# Patient Record
Sex: FEMALE | Race: WHITE | Employment: FULL TIME | URBAN - METROPOLITAN AREA
[De-identification: names, ages, dates, MRNs, and addresses within clinical notes are randomized per-mention and may not be internally consistent; named-entity substitution may affect disease eponyms.]

---

## 2021-12-22 ENCOUNTER — VIRTUAL VISIT (OUTPATIENT)
Dept: HEMATOLOGY | Age: 26
End: 2021-12-22

## 2021-12-22 DIAGNOSIS — R16.0 LIVER MASS: Primary | ICD-10-CM

## 2021-12-22 PROCEDURE — 99203 OFFICE O/P NEW LOW 30 MIN: CPT | Performed by: INTERNAL MEDICINE

## 2021-12-22 NOTE — PROGRESS NOTES
[place]   FMN. Bloating diffuse non-specific abd pain. IBS. Citrocill. MRI 6 months. Post-pone if pregnant. Fractionate TBILI by PCP. Explain. \  NO  No  Social    [address] [facility]      Tania Gandara MD, Rohan Lovelace MD, MPH      Junior Sheets, PA-MERRILL Murillo, ACNP-BC     April S Marco, AGPCNP-BC   Suzie Butcher, FNP-C    Sandra Sellers, AGNP-BC       Julianna Deputado [address]    at [place] [address] [address] [facility], [address] [address]  [address].    [phone]    FAX: [address] [facility]    [address], [address]    [place], [address]    [phone]    FAX: [phone]       Patient Care Team:  Park Feng NP as PCP - General (Nurse Practitioner)      Problem List  Date Reviewed: 1/1/2022          Codes Class Noted    Gilbert's syndrome ICD-10-CM: E80.4  ICD-9-CM: 277.4  1/1/2022        Focal nodular hyperplasia of liver ICD-10-CM: K76.89  ICD-9-CM: 573.8  1/1/2022              The clinicians listed above have asked me to see Jane Cheng in consultation regarding a liver mass. All medical records sent by the referring physicians were reviewed including imaging studies     The patient is a 31 y.o.  female without any history of previous liver disease. The patient underwent a chest CT because of SOB. PE excluded. A liver mass consistent with FNH was noted. A MRI of the liver was performed in in 12/2021. This demonstrated a single mass measuring 7.0 cm left lobe of the liver with a central scar. The patient has been trying to get pregnant. Imaging studies of the liver suggest a normal liver in addition to the mass. The patient does not have any symptoms which could be attributed to the liver disorder.     The patient is not experiencing the following symptoms which are commonly seen in this liver disorder:   fatigue,   pain in the right side over the liver,     The patient completes all daily activities without any functional limitations. ASSESSMENT AND PLAN:  Liver mass   This is most likely to be Focal Nodular Hyperplasia. Focal Nodular Hyperplasia are benign and in general do not increase in size over time. These lesions are not responsive to estrogens or progesterones. HRT or OCH do not need to be stopped if the patient is taking these medications or can be started. Repeated imaging to monitor the size of these lesions is not necessary now that the diagnosis has been confirmed. The patient is asymptomatic     Laboratory studies and imaging suggest the patient has no evidence of liver disease. Will measure the following serologic cancer markers AFP, CA 19-9, CEA    Serologic testing for causes of chronic liver disease was ordered. Will perform imaging of the liver with dynamic MRI in 3 months. [place] Syndrome  There is a mild elevation in total bilirubin   Suspect that is mostly indirect fraction consistent with [place] disease. Will request that hepatic panel be performed which will contain both total and direct bilirubin   If the DBILI is normal this is consistent with [place] syndrome and no furtehr testing needs to be performed. This benign genetic disorder of bilirubin conjugation has no clinical significance. If the patient fasts for 24 hours the TBILI and IBILI will typically double but the the DBILI will remain the same. This has been utilized to confirm [place] syndrome in some situations. Some patients may develop jaundice with prolonged fasting. Pregnancy  The patient was trying to get pregnant when she was found to have the liver mass/FNH.   Given the description of the mass, which is classic for [facility] with the central scar I am less concerned about the mass being anything but [address]. The safest thing to due to to delay trying to get pregnant for 3 months until repeat MRI can be obtained. Treatment of other medical problems in patients with chronic liver disease  There are no contraindications for the patient to take most medications that are necessary for treatment of other medical issues. Counseling for alcohol in patients with chronic liver disease  The patient was counseled regarding alcohol consumption and the effect of alcohol on chronic liver disease. The patient does not consume any significant amount of alcohol. Vaccinations   Since the patient does not have a chronic liver disease which can lead to liver injury screening for HAV and HBV is not needed. Routine vaccinations against other bacterial and viral agents can be performed as indicated. Annual flu vaccination should be administered if indicated. ALLERGIES  Not on File    MEDICATIONS  No current outpatient medications on file. No current facility-administered medications for this visit. SYSTEM REVIEW NOT RELATED TO LIVER DISEASE OR REVIEWED ABOVE:  Constitution systems: Negative for fever, chills, weight gain, weight loss. Eyes: Negative for visual changes. ENT: Negative for sore throat, painful swallowing. Respiratory: Negative for cough, hemoptysis, SOB. Cardiology: Negative for chest pain, palpitations. GI:  Negative for constipation or diarrhea. : Negative for urinary frequency, dysuria, hematuria, nocturia. Skin: Negative for rash. Hematology: Negative for easy bruising, blood clots. Musculo-skelatal: Negative for back pain, muscle pain, weakness. Neurologic: Negative for headaches, dizziness, vertigo, memory problems not related to HE. Psychology: Negative for anxiety, depression. FAMILY HISTORY:  The father Has/had the following following chronic disease(s): None. The mother Has/had the following chronic disease(s): None.     There is no family history of liver disease. SOCIAL HISTORY:  The patient is . The patient has no children. The patient has never used tobacco products. The patient has never consumed significant amounts of alcohol. PHYSICAL EXAMINATION PERFORMED BY VIRTUAL TELEHEALTH:  VS: Not performed   General: No acute distress. Eyes: Sclera anicteric. ENT: No oral lesions. Skin: No rashes. spider angiomata. No jaundice. Abdomen: No obvious distention suggesting ascites. Extremities: No edema. No muscle wasting. Neurologic: Alert and oriented. Cranial nerves grossly intact. LABORATORY STUDIES:  From 11/2021  AST/ALT/ALP/T Bili/ALB:  15/18/77/1.3/4.8  WBC/HB/PLT/INR:  6.9/14.1/301  NA/BUN/CREAT:  16/0.6    SEROLOGIES:  Not available or performed. Testing was performed today. LIVER HISTOLOGY:  Not available or performed    ENDOSCOPIC PROCEDURES:  Not available or performed    RADIOLOGY:  12/2021. Dynamic MRI of liver. Normal appearing liver. Enhancing liver mass with washout and no rim enhancement on delayed images left lobe measuring 7.0 cm. A scar is present within the mass consistent with focal nodular hyperplasia. OTHER TESTING:  Not available or performed    FOLLOW-UP AFTER VIRTUAL VISIT:  Pursuant to the emergency declaration under the [facility] waiver authority and the [employer] Act, this Virtual  Visit was conducted, with the patient's (and/or their legal guardian's) consent, to reduce the patient's risk of exposure to COVID-19 and provide necessary medical care. Services were provided through a video synchronous discussion virtually to substitute for an in-person clinic visit. The patient was located in their home. The provider was located in the [place] [address] office. All of the issues listed above in the Assessment and Plan were discussed with the patient.   All questions were answered. The patient expressed a clear understanding of the above. Orders to obtain laboratory testing will be mailed to the patient and obtained 1 week prior to the next TeleHealth or in-person encounter. An in-[place] [address] in 3 months which should be 1-2 weeks after the next imaging study.         Ludmila Wilson MD  [address] [facility]  [address] [place] [address] [address], [address]  [address] [place] [address]

## 2022-01-01 PROBLEM — K76.89 FOCAL NODULAR HYPERPLASIA OF LIVER: Status: ACTIVE | Noted: 2022-01-01

## 2022-01-01 PROBLEM — E80.4 GILBERT'S SYNDROME: Status: ACTIVE | Noted: 2022-01-01

## 2022-05-11 ENCOUNTER — OFFICE VISIT (OUTPATIENT)
Dept: HEMATOLOGY | Age: 27
End: 2022-05-11

## 2022-05-11 VITALS
WEIGHT: 163 LBS | OXYGEN SATURATION: 99 % | TEMPERATURE: 98.4 F | BODY MASS INDEX: 27.16 KG/M2 | HEIGHT: 65 IN | DIASTOLIC BLOOD PRESSURE: 72 MMHG | RESPIRATION RATE: 14 BRPM | HEART RATE: 75 BPM | SYSTOLIC BLOOD PRESSURE: 120 MMHG

## 2022-05-11 DIAGNOSIS — K76.89 FOCAL NODULAR HYPERPLASIA OF LIVER: Primary | ICD-10-CM

## 2022-05-11 PROCEDURE — 99214 OFFICE O/P EST MOD 30 MIN: CPT | Performed by: INTERNAL MEDICINE

## 2022-05-11 RX ORDER — CIPROFLOXACIN AND DEXAMETHASONE 3; 1 MG/ML; MG/ML
3 SUSPENSION/ DROPS AURICULAR (OTIC) 2 TIMES DAILY
COMMUNITY
Start: 2022-05-10 | End: 2022-05-19

## 2022-05-11 RX ORDER — CITALOPRAM 20 MG/1
TABLET, FILM COATED ORAL
COMMUNITY
Start: 2022-03-21

## 2022-05-11 RX ORDER — BISMUTH SUBSALICYLATE 262 MG
1 TABLET,CHEWABLE ORAL DAILY
COMMUNITY

## 2022-05-11 RX ORDER — GLUCOSAMINE/CHONDR SU A SOD 750-600 MG
TABLET ORAL
COMMUNITY

## 2022-05-11 NOTE — PROGRESS NOTES
Chencho Gupta MD, [place], Adrian Cody, Felix Reynolds MD, MPH      DEBBIE Machado, [facility]     Kim Lara, [facility]   Arabella Mae [facility]    Nidia Hui, [facility]       [facility] [address]    at [address], [facility] [address]  [address].    [phone]    FAX: [address] [place] [address], [address]    [phone]    FAX: [phone]       Patient Care Team:  Renetta Armenta NP as PCP - General (Nurse Practitioner)      Problem List  Date Reviewed: 1/1/2022          Codes Class Noted    Gilbert's syndrome ICD-10-CM: E80.4  ICD-9-CM: 277.4  1/1/2022        Focal nodular hyperplasia of liver ICD-10-CM: K76.89  ICD-9-CM: 573.8  1/1/2022              Qing Gant is being seen at [address] for management of foal nodular hyperplasia and Sylvester Syndrome. The active problem list, all pertinent past medical history, medications, radiologic findings and laboratory findings related to the liver disorder were reviewed and discussed with the patient. The patient is a 31 y.o.  female without any history of previous liver disease. The patient underwent a chest CT because of SOB. PE was excluded. A liver mass consistent with FNH was noted. A MRI of the liver was performed in in 12/2021. This demonstrated a single mass measuring 7.0 cm left lobe of the liver with a central scar. The patient has been trying to get pregnant. Imaging studies of the liver suggest a normal liver in addition to the mass. The patient does not have any symptoms which could be attributed to the liver disorder.     The patient is not experiencing the following symptoms which are commonly seen in this liver disorder:   fatigue,   pain in the right side over the liver,     The patient completes all daily activities without any functional limitations. ASSESSMENT AND PLAN:  Liver mass   This is most likely to be Focal Nodular Hyperplasia. Focal Nodular Hyperplasia are benign and in general do not increase in size over time. These lesions are not responsive to estrogens or progesterones. HRT or OCH do not need to be stopped if the patient is taking these medications or can be started. Repeated imaging to monitor the size of these lesions is not necessary now that the diagnosis has been confirmed. The patient is asymptomatic     Laboratory studies and imaging suggest the patient has no evidence of liver disease. Will measure the following serologic cancer markers AFP, CA 19-9, CEA    Serologic testing for causes of chronic liver disease was ordered. Will perform imaging of the liver with dynamic MRI in 3 months. [place] Syndrome  There is a mild elevation in total bilirubin   Suspect that is mostly indirect fraction consistent with [place] disease. Will request that hepatic panel be performed which will contain both total and direct bilirubin   If the DBILI is normal this is consistent with [place] syndrome and no furtehr testing needs to be performed. This benign genetic disorder of bilirubin conjugation has no clinical significance. If the patient fasts for 24 hours the TBILI and IBILI will typically double but the the DBILI will remain the same. This has been utilized to confirm [place] syndrome in some situations. Some patients may develop jaundice with prolonged fasting. Pregnancy  The patient was trying to get pregnant when she was found to have the liver mass/FNH. Given the description of the mass, which is classic for [facility] with the central scar she can now proceed with attempts to become pregnant.      Bloating and abdominal pain  Discussed that these symptoms are likely to be IBS  Suggests Citrocel       Treatment of other medical problems in patients with chronic liver disease  There are no contraindications for the patient to take most medications that are necessary for treatment of other medical issues. Counseling for alcohol in patients with chronic liver disease  The patient was counseled regarding alcohol consumption and the effect of alcohol on chronic liver disease. The patient does not consume any significant amount of alcohol. Vaccinations   Since the patient does not have a chronic liver disease which can lead to liver injury screening for HAV and HBV is not needed. Routine vaccinations against other bacterial and viral agents can be performed as indicated. Annual flu vaccination should be administered if indicated. ALLERGIES  No Known Allergies    MEDICATIONS  Current Outpatient Medications   Medication Sig    citalopram (CELEXA) 20 mg tablet 1 po qd. Put on file.  ciprofloxacin-dexamethasone (CIPRODEX) 0.3-0.1 % otic suspension 3 Drops two (2) times a day.  Biotin 2,500 mcg cap Take  by mouth.  multivitamin (ONE A DAY) tablet Take 1 Tablet by mouth daily. No current facility-administered medications for this visit. SYSTEM REVIEW NOT RELATED TO LIVER DISEASE OR REVIEWED ABOVE:  Constitution systems: Negative for fever, chills, weight gain, weight loss. Eyes: Negative for visual changes. ENT: Negative for sore throat, painful swallowing. Respiratory: Negative for cough, hemoptysis, SOB. Cardiology: Negative for chest pain, palpitations. GI:  Negative for constipation or diarrhea. : Negative for urinary frequency, dysuria, hematuria, nocturia. Skin: Negative for rash. Hematology: Negative for easy bruising, blood clots. Musculo-skelatal: Negative for back pain, muscle pain, weakness. Neurologic: Negative for headaches, dizziness, vertigo, memory problems not related to HE.   Psychology: Negative for anxiety, depression. FAMILY HISTORY:  The father Has/had the following following chronic disease(s): None. The mother Has/had the following chronic disease(s): None. There is no family history of liver disease. SOCIAL HISTORY:  The patient is . The patient has no children. The patient has never used tobacco products. The patient has never consumed significant amounts of alcohol. PHYSICAL EXAMINATION PERFORMED BY VIRTUAL TELEHEALTH:  VS: Not performed   General: No acute distress. Eyes: Sclera anicteric. ENT: No oral lesions. Skin: No rashes. spider angiomata. No jaundice. Abdomen: No obvious distention suggesting ascites. Extremities: No edema. No muscle wasting. Neurologic: Alert and oriented. Cranial nerves grossly intact. LABORATORY STUDIES:  From 11/2021  AST/ALT/ALP/T Bili/ALB:  15/18/77/1.3/4.8  WBC/HB/PLT/INR:  6.9/14.1/301  NA/BUN/CREAT:  16/0.6    From 3/2022  AST/ALT/ALP/T Bili/ALB:  7/16/67/1.0/4.2  NA/BUN/CREAT:  18/0.7    SEROLOGIES:  1/2015. Anti-HBsurface negative    LIVER HISTOLOGY:  Not available or performed    ENDOSCOPIC PROCEDURES:  Not available or performed    RADIOLOGY:  12/2021. Dynamic MRI of liver. Normal appearing liver. Enhancing liver mass with washout and no rim enhancement on delayed images left lobe measuring 7.0 cm. A scar is present within the mass consistent with focal nodular hyperplasia. 5/2022. Dynamic MRI of liver. Normal appearing liver. Enhancing liver mass with washout and no rim enhancement on delayed images left lobe measuring 6.4 cm. A scar is present within the mass consistent with focal nodular hyperplasia. OTHER TESTING:  Not available or performed    FOLLOW-UP:  All of the issues listed above in the Assessment and Plan were discussed with the patient. All questions were answered. The patient expressed a clear understanding of the above.     No follow-up at [address] [place] [address] of [place] is needed. I would be glad to see the patient back for follow-up at any time in the future if the clinical situation changes.       Tam Betancur MD  [address] [facility] [address]  4 [facility], [address] [place] [address], [address] [place] [address] Street [address]  12 [facility]

## 2022-05-11 NOTE — Clinical Note
5/22/2022    Patient: Julian Altamirano   YOB: 1995   Date of Visit: 5/11/2022     Saranya Kennedy NP  [address] [place] [id number]  Via Fax: [phone]    Dear Saranya Kennedy NP,      Thank you for referring Ms. Julian Altamirano to [address] [place] [address],[address] for evaluation. My notes for this consultation are attached. If you have questions, please do not hesitate to call me. I look forward to following your patient along with you.       Sincerely,    Julee Narvaez MD

## 2022-07-06 DIAGNOSIS — R16.0 LIVER MASS: ICD-10-CM
